# Patient Record
Sex: FEMALE | Race: WHITE | NOT HISPANIC OR LATINO | Employment: UNEMPLOYED
[De-identification: names, ages, dates, MRNs, and addresses within clinical notes are randomized per-mention and may not be internally consistent; named-entity substitution may affect disease eponyms.]

---

## 2017-07-29 ENCOUNTER — HEALTH MAINTENANCE LETTER (OUTPATIENT)
Age: 32
End: 2017-07-29

## 2021-12-31 ENCOUNTER — HOSPITAL ENCOUNTER (OUTPATIENT)
Facility: CLINIC | Age: 36
Setting detail: OBSERVATION
Discharge: HOME | End: 2022-01-01
Attending: EMERGENCY MEDICINE | Admitting: HOSPITALIST
Payer: COMMERCIAL

## 2021-12-31 ENCOUNTER — APPOINTMENT (OUTPATIENT)
Dept: ULTRASOUND IMAGING | Facility: CLINIC | Age: 36
End: 2021-12-31
Attending: EMERGENCY MEDICINE
Payer: COMMERCIAL

## 2021-12-31 DIAGNOSIS — K81.0 ACUTE CHOLECYSTITIS: ICD-10-CM

## 2021-12-31 LAB
ALBUMIN SERPL-MCNC: 3.8 G/DL (ref 3.4–5)
ALP SERPL-CCNC: 111 U/L (ref 40–150)
ALT SERPL W P-5'-P-CCNC: 49 U/L (ref 0–50)
ANION GAP SERPL CALCULATED.3IONS-SCNC: 5 MMOL/L (ref 3–14)
AST SERPL W P-5'-P-CCNC: 55 U/L (ref 0–45)
BASOPHILS # BLD AUTO: 0.1 10E3/UL (ref 0–0.2)
BASOPHILS NFR BLD AUTO: 0 %
BILIRUB DIRECT SERPL-MCNC: 0.2 MG/DL (ref 0–0.2)
BILIRUB SERPL-MCNC: 0.4 MG/DL (ref 0.2–1.3)
BUN SERPL-MCNC: 15 MG/DL (ref 7–30)
CALCIUM SERPL-MCNC: 9.2 MG/DL (ref 8.5–10.1)
CHLORIDE BLD-SCNC: 105 MMOL/L (ref 94–109)
CO2 SERPL-SCNC: 28 MMOL/L (ref 20–32)
CREAT SERPL-MCNC: 0.95 MG/DL (ref 0.52–1.04)
EOSINOPHIL # BLD AUTO: 0.1 10E3/UL (ref 0–0.7)
EOSINOPHIL NFR BLD AUTO: 0 %
ERYTHROCYTE [DISTWIDTH] IN BLOOD BY AUTOMATED COUNT: 13.1 % (ref 10–15)
GFR SERPL CREATININE-BSD FRML MDRD: 80 ML/MIN/1.73M2
GLUCOSE BLD-MCNC: 84 MG/DL (ref 70–99)
HCG SERPL QL: NEGATIVE
HCT VFR BLD AUTO: 38.5 % (ref 35–47)
HGB BLD-MCNC: 12.9 G/DL (ref 11.7–15.7)
IMM GRANULOCYTES # BLD: 0.1 10E3/UL
IMM GRANULOCYTES NFR BLD: 1 %
LIPASE SERPL-CCNC: 148 U/L (ref 73–393)
LYMPHOCYTES # BLD AUTO: 2.9 10E3/UL (ref 0.8–5.3)
LYMPHOCYTES NFR BLD AUTO: 15 %
MCH RBC QN AUTO: 29.9 PG (ref 26.5–33)
MCHC RBC AUTO-ENTMCNC: 33.5 G/DL (ref 31.5–36.5)
MCV RBC AUTO: 89 FL (ref 78–100)
MONOCYTES # BLD AUTO: 1.1 10E3/UL (ref 0–1.3)
MONOCYTES NFR BLD AUTO: 6 %
NEUTROPHILS # BLD AUTO: 14.8 10E3/UL (ref 1.6–8.3)
NEUTROPHILS NFR BLD AUTO: 78 %
NRBC # BLD AUTO: 0 10E3/UL
NRBC BLD AUTO-RTO: 0 /100
PLATELET # BLD AUTO: 406 10E3/UL (ref 150–450)
POTASSIUM BLD-SCNC: 3.6 MMOL/L (ref 3.4–5.3)
PROT SERPL-MCNC: 7.7 G/DL (ref 6.8–8.8)
RBC # BLD AUTO: 4.32 10E6/UL (ref 3.8–5.2)
SODIUM SERPL-SCNC: 138 MMOL/L (ref 133–144)
WBC # BLD AUTO: 19.1 10E3/UL (ref 4–11)

## 2021-12-31 PROCEDURE — 85025 COMPLETE CBC W/AUTO DIFF WBC: CPT | Performed by: EMERGENCY MEDICINE

## 2021-12-31 PROCEDURE — 36415 COLL VENOUS BLD VENIPUNCTURE: CPT | Performed by: EMERGENCY MEDICINE

## 2021-12-31 PROCEDURE — C9803 HOPD COVID-19 SPEC COLLECT: HCPCS

## 2021-12-31 PROCEDURE — 93005 ELECTROCARDIOGRAM TRACING: CPT

## 2021-12-31 PROCEDURE — 80053 COMPREHEN METABOLIC PANEL: CPT | Performed by: EMERGENCY MEDICINE

## 2021-12-31 PROCEDURE — 96375 TX/PRO/DX INJ NEW DRUG ADDON: CPT

## 2021-12-31 PROCEDURE — 83690 ASSAY OF LIPASE: CPT | Performed by: EMERGENCY MEDICINE

## 2021-12-31 PROCEDURE — 82248 BILIRUBIN DIRECT: CPT | Performed by: EMERGENCY MEDICINE

## 2021-12-31 PROCEDURE — 250N000009 HC RX 250: Performed by: EMERGENCY MEDICINE

## 2021-12-31 PROCEDURE — 99285 EMERGENCY DEPT VISIT HI MDM: CPT | Mod: 25

## 2021-12-31 PROCEDURE — 76705 ECHO EXAM OF ABDOMEN: CPT

## 2021-12-31 PROCEDURE — 84703 CHORIONIC GONADOTROPIN ASSAY: CPT | Performed by: EMERGENCY MEDICINE

## 2021-12-31 RX ADMIN — FAMOTIDINE 20 MG: 10 INJECTION, SOLUTION INTRAVENOUS at 23:29

## 2021-12-31 ASSESSMENT — ENCOUNTER SYMPTOMS
FEVER: 0
NAUSEA: 0
COUGH: 0
SHORTNESS OF BREATH: 0

## 2021-12-31 ASSESSMENT — MIFFLIN-ST. JEOR
SCORE: 1723.27
SCORE: 1519.15

## 2022-01-01 ENCOUNTER — ANESTHESIA (OUTPATIENT)
Dept: SURGERY | Facility: CLINIC | Age: 37
End: 2022-01-01
Payer: COMMERCIAL

## 2022-01-01 ENCOUNTER — ANESTHESIA EVENT (OUTPATIENT)
Dept: SURGERY | Facility: CLINIC | Age: 37
End: 2022-01-01
Payer: COMMERCIAL

## 2022-01-01 VITALS
DIASTOLIC BLOOD PRESSURE: 69 MMHG | HEART RATE: 90 BPM | BODY MASS INDEX: 40.19 KG/M2 | TEMPERATURE: 97.7 F | HEIGHT: 64 IN | WEIGHT: 235.4 LBS | OXYGEN SATURATION: 100 % | SYSTOLIC BLOOD PRESSURE: 130 MMHG | RESPIRATION RATE: 13 BRPM

## 2022-01-01 PROBLEM — K81.0 ACUTE CHOLECYSTITIS: Status: ACTIVE | Noted: 2022-01-01

## 2022-01-01 LAB
ALBUMIN SERPL-MCNC: 3.7 G/DL (ref 3.4–5)
ALP SERPL-CCNC: 114 U/L (ref 40–150)
ALT SERPL W P-5'-P-CCNC: 111 U/L (ref 0–50)
ANION GAP SERPL CALCULATED.3IONS-SCNC: 4 MMOL/L (ref 3–14)
AST SERPL W P-5'-P-CCNC: 104 U/L (ref 0–45)
ATRIAL RATE - MUSE: 122 BPM
BILIRUB SERPL-MCNC: 0.3 MG/DL (ref 0.2–1.3)
BUN SERPL-MCNC: 11 MG/DL (ref 7–30)
CALCIUM SERPL-MCNC: 8.6 MG/DL (ref 8.5–10.1)
CHLORIDE BLD-SCNC: 110 MMOL/L (ref 94–109)
CO2 SERPL-SCNC: 23 MMOL/L (ref 20–32)
CREAT SERPL-MCNC: 0.87 MG/DL (ref 0.52–1.04)
DIASTOLIC BLOOD PRESSURE - MUSE: NORMAL MMHG
ERYTHROCYTE [DISTWIDTH] IN BLOOD BY AUTOMATED COUNT: 13.2 % (ref 10–15)
GFR SERPL CREATININE-BSD FRML MDRD: 89 ML/MIN/1.73M2
GLUCOSE BLD-MCNC: 96 MG/DL (ref 70–99)
HCT VFR BLD AUTO: 40.4 % (ref 35–47)
HGB BLD-MCNC: 13 G/DL (ref 11.7–15.7)
HOLD SPECIMEN: NORMAL
INTERPRETATION ECG - MUSE: NORMAL
MCH RBC QN AUTO: 29.4 PG (ref 26.5–33)
MCHC RBC AUTO-ENTMCNC: 32.2 G/DL (ref 31.5–36.5)
MCV RBC AUTO: 91 FL (ref 78–100)
P AXIS - MUSE: 67 DEGREES
PLATELET # BLD AUTO: 386 10E3/UL (ref 150–450)
POTASSIUM BLD-SCNC: 3.9 MMOL/L (ref 3.4–5.3)
PR INTERVAL - MUSE: 136 MS
PROT SERPL-MCNC: 7.3 G/DL (ref 6.8–8.8)
QRS DURATION - MUSE: 78 MS
QT - MUSE: 316 MS
QTC - MUSE: 450 MS
R AXIS - MUSE: 13 DEGREES
RBC # BLD AUTO: 4.42 10E6/UL (ref 3.8–5.2)
SARS-COV-2 RNA RESP QL NAA+PROBE: NEGATIVE
SODIUM SERPL-SCNC: 137 MMOL/L (ref 133–144)
SYSTOLIC BLOOD PRESSURE - MUSE: NORMAL MMHG
T AXIS - MUSE: 40 DEGREES
VENTRICULAR RATE- MUSE: 122 BPM
WBC # BLD AUTO: 18 10E3/UL (ref 4–11)

## 2022-01-01 PROCEDURE — 88304 TISSUE EXAM BY PATHOLOGIST: CPT | Mod: 26 | Performed by: PATHOLOGY

## 2022-01-01 PROCEDURE — 96361 HYDRATE IV INFUSION ADD-ON: CPT

## 2022-01-01 PROCEDURE — 250N000011 HC RX IP 250 OP 636: Performed by: PHYSICIAN ASSISTANT

## 2022-01-01 PROCEDURE — 99207 PR MOONLIGHTING INDICATOR: CPT | Performed by: HOSPITALIST

## 2022-01-01 PROCEDURE — 250N000009 HC RX 250: Performed by: SURGERY

## 2022-01-01 PROCEDURE — 250N000011 HC RX IP 250 OP 636: Performed by: INTERNAL MEDICINE

## 2022-01-01 PROCEDURE — 36415 COLL VENOUS BLD VENIPUNCTURE: CPT | Performed by: HOSPITALIST

## 2022-01-01 PROCEDURE — 99207 PR CDG-CHARGE REQUIRED MANUAL ENTRY: CPT | Performed by: HOSPITALIST

## 2022-01-01 PROCEDURE — 250N000011 HC RX IP 250 OP 636: Performed by: NURSE ANESTHETIST, CERTIFIED REGISTERED

## 2022-01-01 PROCEDURE — 250N000025 HC SEVOFLURANE, PER MIN: Performed by: SURGERY

## 2022-01-01 PROCEDURE — 80053 COMPREHEN METABOLIC PANEL: CPT | Performed by: PHYSICIAN ASSISTANT

## 2022-01-01 PROCEDURE — 272N000001 HC OR GENERAL SUPPLY STERILE: Performed by: SURGERY

## 2022-01-01 PROCEDURE — G0378 HOSPITAL OBSERVATION PER HR: HCPCS

## 2022-01-01 PROCEDURE — 999N000141 HC STATISTIC PRE-PROCEDURE NURSING ASSESSMENT: Performed by: SURGERY

## 2022-01-01 PROCEDURE — 250N000011 HC RX IP 250 OP 636: Performed by: EMERGENCY MEDICINE

## 2022-01-01 PROCEDURE — 258N000003 HC RX IP 258 OP 636: Performed by: EMERGENCY MEDICINE

## 2022-01-01 PROCEDURE — 87635 SARS-COV-2 COVID-19 AMP PRB: CPT | Performed by: EMERGENCY MEDICINE

## 2022-01-01 PROCEDURE — 250N000013 HC RX MED GY IP 250 OP 250 PS 637: Performed by: ANESTHESIOLOGY

## 2022-01-01 PROCEDURE — 88304 TISSUE EXAM BY PATHOLOGIST: CPT | Mod: TC | Performed by: SURGERY

## 2022-01-01 PROCEDURE — 250N000011 HC RX IP 250 OP 636: Performed by: ANESTHESIOLOGY

## 2022-01-01 PROCEDURE — 250N000013 HC RX MED GY IP 250 OP 250 PS 637: Performed by: PHYSICIAN ASSISTANT

## 2022-01-01 PROCEDURE — 85027 COMPLETE CBC AUTOMATED: CPT | Performed by: PHYSICIAN ASSISTANT

## 2022-01-01 PROCEDURE — 710N000009 HC RECOVERY PHASE 1, LEVEL 1, PER MIN: Performed by: SURGERY

## 2022-01-01 PROCEDURE — 36415 COLL VENOUS BLD VENIPUNCTURE: CPT | Performed by: PHYSICIAN ASSISTANT

## 2022-01-01 PROCEDURE — 258N000003 HC RX IP 258 OP 636: Performed by: REGISTERED NURSE

## 2022-01-01 PROCEDURE — 99235 HOSP IP/OBS SAME DATE MOD 70: CPT | Performed by: HOSPITALIST

## 2022-01-01 PROCEDURE — 99204 OFFICE O/P NEW MOD 45 MIN: CPT | Mod: 57 | Performed by: SURGERY

## 2022-01-01 PROCEDURE — 96365 THER/PROPH/DIAG IV INF INIT: CPT

## 2022-01-01 PROCEDURE — 87040 BLOOD CULTURE FOR BACTERIA: CPT | Performed by: HOSPITALIST

## 2022-01-01 PROCEDURE — 370N000017 HC ANESTHESIA TECHNICAL FEE, PER MIN: Performed by: SURGERY

## 2022-01-01 PROCEDURE — 250N000009 HC RX 250: Performed by: PHYSICIAN ASSISTANT

## 2022-01-01 PROCEDURE — 250N000011 HC RX IP 250 OP 636: Performed by: REGISTERED NURSE

## 2022-01-01 PROCEDURE — 47562 LAPAROSCOPIC CHOLECYSTECTOMY: CPT | Performed by: SURGERY

## 2022-01-01 PROCEDURE — 99207 PR NO CHARGE LOS: CPT | Performed by: INTERNAL MEDICINE

## 2022-01-01 PROCEDURE — 360N000076 HC SURGERY LEVEL 3, PER MIN: Performed by: SURGERY

## 2022-01-01 PROCEDURE — 250N000009 HC RX 250: Performed by: REGISTERED NURSE

## 2022-01-01 PROCEDURE — 47562 LAPAROSCOPIC CHOLECYSTECTOMY: CPT | Mod: AS | Performed by: PHYSICIAN ASSISTANT

## 2022-01-01 PROCEDURE — 120N000001 HC R&B MED SURG/OB

## 2022-01-01 PROCEDURE — 258N000003 HC RX IP 258 OP 636: Performed by: NURSE ANESTHETIST, CERTIFIED REGISTERED

## 2022-01-01 PROCEDURE — 250N000009 HC RX 250: Performed by: ANESTHESIOLOGY

## 2022-01-01 RX ORDER — PROPOFOL 10 MG/ML
INJECTION, EMULSION INTRAVENOUS PRN
Status: DISCONTINUED | OUTPATIENT
Start: 2022-01-01 | End: 2022-01-01

## 2022-01-01 RX ORDER — LEVONORGESTREL/ETHIN.ESTRADIOL 0.1-0.02MG
1 TABLET ORAL DAILY
Status: DISCONTINUED | OUTPATIENT
Start: 2022-01-01 | End: 2022-01-01 | Stop reason: HOSPADM

## 2022-01-01 RX ORDER — DEXMEDETOMIDINE HYDROCHLORIDE 4 UG/ML
INJECTION, SOLUTION INTRAVENOUS PRN
Status: DISCONTINUED | OUTPATIENT
Start: 2022-01-01 | End: 2022-01-01

## 2022-01-01 RX ORDER — PROPOFOL 10 MG/ML
INJECTION, EMULSION INTRAVENOUS CONTINUOUS PRN
Status: DISCONTINUED | OUTPATIENT
Start: 2022-01-01 | End: 2022-01-01

## 2022-01-01 RX ORDER — FENTANYL CITRATE 0.05 MG/ML
25 INJECTION, SOLUTION INTRAMUSCULAR; INTRAVENOUS EVERY 5 MIN PRN
Status: DISCONTINUED | OUTPATIENT
Start: 2022-01-01 | End: 2022-01-01 | Stop reason: HOSPADM

## 2022-01-01 RX ORDER — NALOXONE HYDROCHLORIDE 0.4 MG/ML
0.2 INJECTION, SOLUTION INTRAMUSCULAR; INTRAVENOUS; SUBCUTANEOUS
Status: DISCONTINUED | OUTPATIENT
Start: 2022-01-01 | End: 2022-01-01 | Stop reason: HOSPADM

## 2022-01-01 RX ORDER — CEFTRIAXONE 2 G/1
2 INJECTION, POWDER, FOR SOLUTION INTRAMUSCULAR; INTRAVENOUS EVERY 24 HOURS
Status: DISCONTINUED | OUTPATIENT
Start: 2022-01-02 | End: 2022-01-01

## 2022-01-01 RX ORDER — BUPIVACAINE HYDROCHLORIDE AND EPINEPHRINE 2.5; 5 MG/ML; UG/ML
INJECTION, SOLUTION INFILTRATION; PERINEURAL PRN
Status: DISCONTINUED | OUTPATIENT
Start: 2022-01-01 | End: 2022-01-01 | Stop reason: HOSPADM

## 2022-01-01 RX ORDER — LIDOCAINE HYDROCHLORIDE 20 MG/ML
INJECTION, SOLUTION INFILTRATION; PERINEURAL PRN
Status: DISCONTINUED | OUTPATIENT
Start: 2022-01-01 | End: 2022-01-01

## 2022-01-01 RX ORDER — PROCHLORPERAZINE MALEATE 10 MG
10 TABLET ORAL EVERY 6 HOURS PRN
Status: DISCONTINUED | OUTPATIENT
Start: 2022-01-01 | End: 2022-01-01 | Stop reason: HOSPADM

## 2022-01-01 RX ORDER — DEXAMETHASONE SODIUM PHOSPHATE 4 MG/ML
INJECTION, SOLUTION INTRA-ARTICULAR; INTRALESIONAL; INTRAMUSCULAR; INTRAVENOUS; SOFT TISSUE PRN
Status: DISCONTINUED | OUTPATIENT
Start: 2022-01-01 | End: 2022-01-01

## 2022-01-01 RX ORDER — OXYCODONE HYDROCHLORIDE 5 MG/1
5 TABLET ORAL EVERY 4 HOURS PRN
Status: DISCONTINUED | OUTPATIENT
Start: 2022-01-01 | End: 2022-01-01 | Stop reason: HOSPADM

## 2022-01-01 RX ORDER — FENTANYL CITRATE 50 UG/ML
INJECTION, SOLUTION INTRAMUSCULAR; INTRAVENOUS PRN
Status: DISCONTINUED | OUTPATIENT
Start: 2022-01-01 | End: 2022-01-01

## 2022-01-01 RX ORDER — SODIUM CHLORIDE, SODIUM LACTATE, POTASSIUM CHLORIDE, CALCIUM CHLORIDE 600; 310; 30; 20 MG/100ML; MG/100ML; MG/100ML; MG/100ML
INJECTION, SOLUTION INTRAVENOUS CONTINUOUS
Status: DISCONTINUED | OUTPATIENT
Start: 2022-01-01 | End: 2022-01-01 | Stop reason: HOSPADM

## 2022-01-01 RX ORDER — ONDANSETRON 2 MG/ML
4 INJECTION INTRAMUSCULAR; INTRAVENOUS EVERY 6 HOURS PRN
Status: DISCONTINUED | OUTPATIENT
Start: 2022-01-01 | End: 2022-01-01 | Stop reason: HOSPADM

## 2022-01-01 RX ORDER — LORATADINE 10 MG/1
10 TABLET ORAL DAILY
Status: DISCONTINUED | OUTPATIENT
Start: 2022-01-01 | End: 2022-01-01 | Stop reason: HOSPADM

## 2022-01-01 RX ORDER — ONDANSETRON 4 MG/1
4 TABLET, ORALLY DISINTEGRATING ORAL EVERY 30 MIN PRN
Status: DISCONTINUED | OUTPATIENT
Start: 2022-01-01 | End: 2022-01-01 | Stop reason: HOSPADM

## 2022-01-01 RX ORDER — FENTANYL CITRATE 0.05 MG/ML
25 INJECTION, SOLUTION INTRAMUSCULAR; INTRAVENOUS
Status: CANCELLED | OUTPATIENT
Start: 2022-01-01

## 2022-01-01 RX ORDER — ONDANSETRON 4 MG/1
4 TABLET, ORALLY DISINTEGRATING ORAL EVERY 6 HOURS PRN
Status: DISCONTINUED | OUTPATIENT
Start: 2022-01-01 | End: 2022-01-01 | Stop reason: HOSPADM

## 2022-01-01 RX ORDER — DULOXETIN HYDROCHLORIDE 30 MG/1
30 CAPSULE, DELAYED RELEASE ORAL 2 TIMES DAILY
Status: DISCONTINUED | OUTPATIENT
Start: 2022-01-01 | End: 2022-01-01 | Stop reason: HOSPADM

## 2022-01-01 RX ORDER — PROCHLORPERAZINE 25 MG
25 SUPPOSITORY, RECTAL RECTAL EVERY 12 HOURS PRN
Status: DISCONTINUED | OUTPATIENT
Start: 2022-01-01 | End: 2022-01-01 | Stop reason: HOSPADM

## 2022-01-01 RX ORDER — ACETAMINOPHEN 325 MG/1
650 TABLET ORAL EVERY 6 HOURS PRN
Status: DISCONTINUED | OUTPATIENT
Start: 2022-01-01 | End: 2022-01-01 | Stop reason: HOSPADM

## 2022-01-01 RX ORDER — CEFTRIAXONE 1 G/1
1 INJECTION, POWDER, FOR SOLUTION INTRAMUSCULAR; INTRAVENOUS EVERY 24 HOURS
Status: DISCONTINUED | OUTPATIENT
Start: 2022-01-02 | End: 2022-01-01

## 2022-01-01 RX ORDER — KETOROLAC TROMETHAMINE 30 MG/ML
30 INJECTION, SOLUTION INTRAMUSCULAR; INTRAVENOUS ONCE
Status: COMPLETED | OUTPATIENT
Start: 2022-01-01 | End: 2022-01-01

## 2022-01-01 RX ORDER — LIDOCAINE 40 MG/G
CREAM TOPICAL
Status: DISCONTINUED | OUTPATIENT
Start: 2022-01-01 | End: 2022-01-01 | Stop reason: HOSPADM

## 2022-01-01 RX ORDER — HYDROXYZINE HYDROCHLORIDE 25 MG/1
25 TABLET, FILM COATED ORAL ONCE
Status: COMPLETED | OUTPATIENT
Start: 2022-01-01 | End: 2022-01-01

## 2022-01-01 RX ORDER — NALOXONE HYDROCHLORIDE 0.4 MG/ML
0.4 INJECTION, SOLUTION INTRAMUSCULAR; INTRAVENOUS; SUBCUTANEOUS
Status: DISCONTINUED | OUTPATIENT
Start: 2022-01-01 | End: 2022-01-01 | Stop reason: HOSPADM

## 2022-01-01 RX ORDER — MEPERIDINE HYDROCHLORIDE 25 MG/ML
12.5 INJECTION INTRAMUSCULAR; INTRAVENOUS; SUBCUTANEOUS
Status: DISCONTINUED | OUTPATIENT
Start: 2022-01-01 | End: 2022-01-01 | Stop reason: HOSPADM

## 2022-01-01 RX ORDER — OXYCODONE HYDROCHLORIDE 5 MG/1
5 TABLET ORAL EVERY 4 HOURS PRN
Qty: 12 TABLET | Refills: 0 | Status: SHIPPED | OUTPATIENT
Start: 2022-01-01

## 2022-01-01 RX ORDER — ONDANSETRON 2 MG/ML
4 INJECTION INTRAMUSCULAR; INTRAVENOUS EVERY 30 MIN PRN
Status: DISCONTINUED | OUTPATIENT
Start: 2022-01-01 | End: 2022-01-01 | Stop reason: HOSPADM

## 2022-01-01 RX ORDER — MULTIVITAMIN,THERAPEUTIC
1 TABLET ORAL DAILY
COMMUNITY

## 2022-01-01 RX ORDER — LORATADINE 10 MG/1
10 TABLET ORAL DAILY
COMMUNITY

## 2022-01-01 RX ORDER — HYDROMORPHONE HCL IN WATER/PF 6 MG/30 ML
0.2 PATIENT CONTROLLED ANALGESIA SYRINGE INTRAVENOUS EVERY 5 MIN PRN
Status: DISCONTINUED | OUTPATIENT
Start: 2022-01-01 | End: 2022-01-01 | Stop reason: HOSPADM

## 2022-01-01 RX ORDER — BUPROPION HYDROCHLORIDE 100 MG/1
100 TABLET ORAL DAILY
Status: DISCONTINUED | OUTPATIENT
Start: 2022-01-01 | End: 2022-01-01 | Stop reason: HOSPADM

## 2022-01-01 RX ORDER — DULOXETIN HYDROCHLORIDE 30 MG/1
30 CAPSULE, DELAYED RELEASE ORAL 2 TIMES DAILY
Status: ON HOLD | COMMUNITY
End: 2022-01-01

## 2022-01-01 RX ORDER — SCOLOPAMINE TRANSDERMAL SYSTEM 1 MG/1
1 PATCH, EXTENDED RELEASE TRANSDERMAL
Status: DISCONTINUED | OUTPATIENT
Start: 2022-01-01 | End: 2022-01-01 | Stop reason: HOSPADM

## 2022-01-01 RX ORDER — CEFTRIAXONE 1 G/1
1 INJECTION, POWDER, FOR SOLUTION INTRAMUSCULAR; INTRAVENOUS ONCE
Status: COMPLETED | OUTPATIENT
Start: 2022-01-01 | End: 2022-01-01

## 2022-01-01 RX ORDER — SODIUM CHLORIDE, SODIUM LACTATE, POTASSIUM CHLORIDE, CALCIUM CHLORIDE 600; 310; 30; 20 MG/100ML; MG/100ML; MG/100ML; MG/100ML
INJECTION, SOLUTION INTRAVENOUS CONTINUOUS PRN
Status: DISCONTINUED | OUTPATIENT
Start: 2022-01-01 | End: 2022-01-01

## 2022-01-01 RX ORDER — DULOXETIN HYDROCHLORIDE 20 MG/1
20 CAPSULE, DELAYED RELEASE ORAL DAILY
COMMUNITY

## 2022-01-01 RX ORDER — ACETAMINOPHEN 650 MG/1
650 SUPPOSITORY RECTAL EVERY 6 HOURS PRN
Status: DISCONTINUED | OUTPATIENT
Start: 2022-01-01 | End: 2022-01-01 | Stop reason: HOSPADM

## 2022-01-01 RX ORDER — BUPROPION HYDROCHLORIDE 150 MG/1
150 TABLET ORAL EVERY MORNING
COMMUNITY

## 2022-01-01 RX ADMIN — PROPOFOL 30 MCG/KG/MIN: 10 INJECTION, EMULSION INTRAVENOUS at 09:26

## 2022-01-01 RX ADMIN — METRONIDAZOLE 500 MG: 500 INJECTION, SOLUTION INTRAVENOUS at 09:52

## 2022-01-01 RX ADMIN — PROPOFOL 300 MG: 10 INJECTION, EMULSION INTRAVENOUS at 09:22

## 2022-01-01 RX ADMIN — ROCURONIUM BROMIDE 60 MG: 50 INJECTION, SOLUTION INTRAVENOUS at 09:22

## 2022-01-01 RX ADMIN — FENTANYL CITRATE 50 MCG: 50 INJECTION, SOLUTION INTRAMUSCULAR; INTRAVENOUS at 09:22

## 2022-01-01 RX ADMIN — FENTANYL CITRATE 50 MCG: 50 INJECTION, SOLUTION INTRAMUSCULAR; INTRAVENOUS at 09:37

## 2022-01-01 RX ADMIN — HYDROMORPHONE HYDROCHLORIDE 0.5 MG: 1 INJECTION, SOLUTION INTRAMUSCULAR; INTRAVENOUS; SUBCUTANEOUS at 09:37

## 2022-01-01 RX ADMIN — SCOPALAMINE 1 PATCH: 1 PATCH, EXTENDED RELEASE TRANSDERMAL at 09:03

## 2022-01-01 RX ADMIN — SUGAMMADEX 400 MG: 100 INJECTION, SOLUTION INTRAVENOUS at 10:03

## 2022-01-01 RX ADMIN — DEXMEDETOMIDINE HYDROCHLORIDE 12 MCG: 200 INJECTION INTRAVENOUS at 09:35

## 2022-01-01 RX ADMIN — DEXMEDETOMIDINE HYDROCHLORIDE 8 MCG: 200 INJECTION INTRAVENOUS at 09:57

## 2022-01-01 RX ADMIN — LIDOCAINE HYDROCHLORIDE 100 MG: 20 INJECTION, SOLUTION INFILTRATION; PERINEURAL at 09:22

## 2022-01-01 RX ADMIN — SODIUM CHLORIDE 1000 ML: 9 INJECTION, SOLUTION INTRAVENOUS at 02:06

## 2022-01-01 RX ADMIN — SODIUM CHLORIDE, POTASSIUM CHLORIDE, SODIUM LACTATE AND CALCIUM CHLORIDE: 600; 310; 30; 20 INJECTION, SOLUTION INTRAVENOUS at 09:15

## 2022-01-01 RX ADMIN — FENTANYL CITRATE 25 MCG: 0.05 INJECTION, SOLUTION INTRAMUSCULAR; INTRAVENOUS at 10:37

## 2022-01-01 RX ADMIN — ACETAMINOPHEN 650 MG: 325 TABLET, FILM COATED ORAL at 14:10

## 2022-01-01 RX ADMIN — CEFTRIAXONE SODIUM 1 G: 1 INJECTION, POWDER, FOR SOLUTION INTRAMUSCULAR; INTRAVENOUS at 00:57

## 2022-01-01 RX ADMIN — KETOROLAC TROMETHAMINE 30 MG: 30 INJECTION, SOLUTION INTRAMUSCULAR; INTRAVENOUS at 10:54

## 2022-01-01 RX ADMIN — HYDROXYZINE HYDROCHLORIDE 25 MG: 25 TABLET ORAL at 09:03

## 2022-01-01 RX ADMIN — CEFEPIME HYDROCHLORIDE 2 G: 2 INJECTION, POWDER, FOR SOLUTION INTRAVENOUS at 15:03

## 2022-01-01 RX ADMIN — MIDAZOLAM 2 MG: 1 INJECTION INTRAMUSCULAR; INTRAVENOUS at 09:15

## 2022-01-01 RX ADMIN — DEXAMETHASONE SODIUM PHOSPHATE 4 MG: 4 INJECTION, SOLUTION INTRA-ARTICULAR; INTRALESIONAL; INTRAMUSCULAR; INTRAVENOUS; SOFT TISSUE at 09:35

## 2022-01-01 RX ADMIN — PHENYLEPHRINE HYDROCHLORIDE 100 MCG: 10 INJECTION INTRAVENOUS at 09:43

## 2022-01-01 ASSESSMENT — ACTIVITIES OF DAILY LIVING (ADL)
ADLS_ACUITY_SCORE: 7
ADLS_ACUITY_SCORE: 4
ADLS_ACUITY_SCORE: 7
ADLS_ACUITY_SCORE: 7
ADLS_ACUITY_SCORE: 4

## 2022-01-01 ASSESSMENT — LIFESTYLE VARIABLES: TOBACCO_USE: 0

## 2022-01-01 ASSESSMENT — MIFFLIN-ST. JEOR: SCORE: 1747.77

## 2022-01-01 ASSESSMENT — ENCOUNTER SYMPTOMS
SEIZURES: 0
DYSRHYTHMIAS: 0

## 2022-01-01 NOTE — ANESTHESIA POSTPROCEDURE EVALUATION
Patient: Diane Robison    Procedure: Procedure(s):  LAPAROSCOPIC CHOLECYSTECTOMY       Diagnosis:Acute cholecystitis [K81.0]  Diagnosis Additional Information: No value filed.    Anesthesia Type:  General    Note:  Disposition: Admission   Postop Pain Control: Uneventful            Sign Out: Well controlled pain   PONV: No   Neuro/Psych: Uneventful            Sign Out: Acceptable/Baseline neuro status   Airway/Respiratory: Uneventful            Sign Out: Acceptable/Baseline resp. status   CV/Hemodynamics: Uneventful            Sign Out: Acceptable CV status; No obvious hypovolemia; No obvious fluid overload   Other NRE: NONE   DID A NON-ROUTINE EVENT OCCUR? No           Last vitals:  Vitals Value Taken Time   /78 01/01/22 1120   Temp 36.8  C (98.2  F) 01/01/22 1110   Pulse 86 01/01/22 1124   Resp 17 01/01/22 1124   SpO2 99 % 01/01/22 1124   Vitals shown include unvalidated device data.    Electronically Signed By: Katia Dugan MD  January 1, 2022  3:41 PM

## 2022-01-01 NOTE — BRIEF OP NOTE
[facility]    Brief Operative Note    Pre-operative diagnosis: Acute cholecystitis [K81.0]  Post-operative diagnosis Acute Cholecystitis    Procedure: Procedure(s):  LAPAROSCOPIC CHOLECYSTECTOMY     Surgeon: Surgeon(s) and Role:     * Juan C Caro MD - Primary     * Marco Husain PA-C - Assisting     Anesthesia: General   Estimated Blood Loss: Less than 10 ml    Drains: None  Specimens:   ID Type Source Tests Collected by Time Destination   1 : gallbladder and contents Tissue Gallbladder SURGICAL PATHOLOGY EXAM Juan C Caro MD 1/1/2022  9:40 AM      Findings:   None.  Complications: None.  Implants: * No implants in log *

## 2022-01-01 NOTE — H&P
[facility]    History and Physical - Hospitalist Service       Date of Admission:  12/31/2021    Assessment & Plan      Diane Robison is a 36 year old female admitted on 12/31/2021. She has a PMH most remarkable for anxiety/depression who presented with intermittent epigastric pain. She was admitted with acute cholecystitis.    1. Acute Cholecystitis. Patient has right upper quadrant pain / epigastric with evidence of mild gallbladder dilation with a WBC of 19. She has mildly elevated AST (55) but no pattern of other biliary obstruction. No other obvious source of WBC elevation.   --General surgery consult  --Continue ceftriaxone which was ordered in the ER  --Ordered blood cultures  --NPO    Chronic Issues: Awaiting med rec  1. Depression with anxiety  2. Hypothyroidism  3. Adjustment disorder     Diet:   NPO  DVT Prophylaxis: Pneumatic Compression Devices  Perkins Catheter: Not present  Central Lines: None  Code Status:   Full Code    Clinically Significant Risk Factors Present on Admission                # Obesity: last Body mass index is 31.76 kg/m .      Disposition Plan   Expected Discharge: Patient is admitted with acute cholecystitis. Will likely discharge in 1 - 2 days to prior living arrangement.  Anticipated discharge location:  Awaiting care coordination huddle  Delays:     Pending eval      The patient's care was discussed with the Bedside Nurse and Patient.    Hal Will MD PhD  [facility]  Securely message with the Vocera Web Console (learn more here)  Text page via [employer] Paging/Directory  ______________________________________________________________________    Chief Complaint   Chest / Epigastric Pain    History is obtained from the patient    History of Present Illness   Diane Robison is a 36 year old female admitted on 12/31/2021. She has a PMH most remarkable for anxiety/depression who presented with intermittent epigastric pain. She was  admitted with acute cholecystitis.    Patient notes that she developed two episodes of acute epigastric / right upper quadrant pain while eating tonight. The pain initially subsided and then came back with further food intake. Pain was worse with sitting up and was relieved with laying down. Pain radiated to patients right shoulder.  Patient presented for evaluation and she hasn't had symptoms like this before. No fevers, chills, chest pain, SOB, nausea, vomiting, or diarrhea. No dizziness, light-headedness, syncope, or presyncope.    Review of Systems    The 10 point Review of Systems is negative other than noted in the HPI or here.    Past Medical History    I have reviewed this patient's medical history and updated it with pertinent information if needed.   Past Medical History:   Diagnosis Date     Allergic rhinitis, cause unspecified      Contact dermatitis and other eczema, due to unspecified cause      Primary focal hyperhidrosis     bilateral axillary        Past Surgical History   I have reviewed this patient's surgical history and updated it with pertinent information if needed.  Past Surgical History:   Procedure Laterality Date     TONSILLECTOMY  2007       Social History   I have reviewed this patient's social history and updated it with pertinent information if needed.  Social History     Tobacco Use     Smoking status: Never Smoker     Smokeless tobacco: Never Used   Substance Use Topics     Alcohol use: Yes     Comment: rarely     Drug use: No       Family History   I have reviewed this patient's family history and updated it with pertinent information if needed.  Family History   Problem Relation Age of Onset     Prostate Cancer Maternal Grandfather      Heart Disease Maternal Grandfather      Lipids Maternal Grandmother      Hypertension Mother        Prior to Admission Medications   Prior to Admission Medications   Prescriptions Last Dose Informant Patient Reported? Taking?   MOMETASONE FUROATE  0.1 % EX CREA   No No   Sig: apply twice daily to body as needed.   PIMECROLIMUS 1 % EX CREA   No No   Sig: apply twice daily to face.   buPROPion (WELLBUTRIN) 100 MG tablet   Yes No   Sig: Take 1 tablet by mouth daily.   levonorgestrel-ethinyl estradiol (AVIANE,ALESSE,LESSINA) 0.1-20 MG-MCG per tablet   No No   Sig: Take 1 tablet by mouth daily   levothyroxine (SYNTHROID, LEVOTHROID) 50 MCG tablet   No No   Sig: Take 1 tablet by mouth daily.   venlafaxine (EFFEXOR-ER) 150 MG TB24   No No   Sig: Take 1 tablet by mouth daily.      Facility-Administered Medications: None     Allergies   No Known Allergies    Physical Exam   Vital Signs: Temp: 97.5  F (36.4  C) Temp src: Oral BP: (!) 145/67 Pulse: 80   Resp: 18 SpO2: 100 % O2 Device: None (Room air)    Weight: 185 lbs 0 oz    General Appearance: Well appearing, no distress  Eyes: MARILYNN, EOMI  HEENT: NC, AT. MMM.   Respiratory: CTAB, normal work of breathing  Cardiovascular: Regular Rate and Rhythm, normal S1, S2. No murmurs, rubs, gallops  GI: Soft, mild tenderness at RUQ and epigastric area  Lymph/Hematologic: No asymetric swelling, edema. No bruising.  Genitourinary: Deferred  Skin: No rashes, lesions, wounds.  Musculoskeletal: Warm, well perfused  Neurologic: AOx4, CNII-XII intact.  Psychiatric: Euthymic. Mood appropriate.     Data   Data reviewed today: I reviewed all medications, new labs and imaging results over the last 24 hours. I personally reviewed the right upper quadrant ultrasound image(s) showing findings concerning for cholecystitis.    Recent Labs   Lab 12/31/21  2313   WBC 19.1*   HGB 12.9   MCV 89         POTASSIUM 3.6   CHLORIDE 105   CO2 28   BUN 15   CR 0.95   ANIONGAP 5   BRICE 9.2   GLC 84   ALBUMIN 3.8   PROTTOTAL 7.7   BILITOTAL 0.4   ALKPHOS 111   ALT 49   AST 55*   LIPASE 148

## 2022-01-01 NOTE — OP NOTE
General Surgery Operative Note   PREOPERATIVE DIAGNOSIS: Cholelithiasis/cholecystitis  POSTOPERATIVE DIAGNOSIS: Same  PROCEDURE: LAPAROSCOPIC CHOLECYSTECTOMY   ANESTHESIA: General.   PREOPERATIVE MEDICATIONS: Received cefotetan hand earlier  SURGEON: Juan C Caro MD   ASSISTANT: Brigido Husain PA-C, Physician assistant first assistant was necessary during the performance of this procedure for expertise in patient positioning, prepping, draping, trocar placement, camera management, retraction and exposure, and suctioning.  INDICATIONS: Patient presented with signs and symptoms consistent with cholelithiasis/cholecystitis.  Extensive discussion was undertaken regarding treatment options.  We reviewed the procedure of cholecystectomy.  Risks including but not limited to bleeding, infection, bile duct or visceral injury were all reviewed in great detail.  The patient's questions were all answered to satisfaction.  The patient wishes to proceed.  PROCEDURE: After informed consent was obtained the patient was taken to the operating suite and uneventfully endotracheally intubated. The abdomen was prepped and draped in a sterile fashion. Surgeon initiated timeout was acknowledged. A stab incision was then made within the umbilicus through which a 5mm optical trocar was used to gain access to the abdominal cavity. A CO2 pneumoperitoneum was then created. An 11mm trocar was then placed in the subxiphoid position and a 5mmport was placed in the right subcostal position. We elevated the liver and were able to identify the gallbladder. The gallbladder was grasped and used to elevate the liver further. I began dissecting out some fatty adhesions down near the neck of the gallbladder until a cystic duct was encountered. I continued the dissection using combination of sharp and blunt dissection until the cystic duct was largely dissected out. I continued the dissection up along the sides of the gallbladder, both medially and  laterally, until I had created a space between the gallbladder and the liver. At this point, I encountered the cystic artery, just posterior and lateral to the cystic duct. This again was dissected out. Once I had created a window where only the cystic artery and duct were noted to be entering the gallbladder, I felt that this represented our critical view. The cystic artery and duct were then doubly clipped and divided. I continued the dissection up along the body of the gallbladder, freeing all attachments and adhesions of the gallbladder to the liver. Gallbladder was removed from the liver in an atraumatic fashion. The gallbladder was then brought up through the subxiphoid port site and removed from the abdomen. The gallbladder fossa was reinspected, and all areas of bleeding were managed with electrocautery. I irrigated the area with normal saline and aspirated it out. I then removed the umbilical port trocar. I then reinspected the abdomen, and everything appeared to be in pristine condition. I removed the trocars under laparoscopic visualization and desufflated the abdomen with the Edgar suction . The skin edges were reapproximated with 4-0 Vicryl and Steri-Strips. The patient was uneventfully extubated, awakened and taken to the PACU in stable condition. At the conclusion of the case, all lap and needle counts were correct.   ESTIMATED BLOOD LOSS: 5cc   Juan C Caro MD, MD

## 2022-01-01 NOTE — ANESTHESIA CARE TRANSFER NOTE
Patient: Diane Robison    Procedure: Procedure(s):  LAPAROSCOPIC CHOLECYSTECTOMY       Diagnosis: Acute cholecystitis [K81.0]  Diagnosis Additional Information: No value filed.    Anesthesia Type:   General     Note:    Oropharynx: oropharynx clear of all foreign objects and spontaneously breathing  Level of Consciousness: drowsy  Oxygen Supplementation: face mask  Level of Supplemental Oxygen (L/min / FiO2): 6  Independent Airway: airway patency satisfactory and stable  Dentition: dentition unchanged  Vital Signs Stable: post-procedure vital signs reviewed and stable  Report to RN Given: handoff report given  Patient transferred to: PACU  Comments: Neuromuscular blockade reversed with sugammadex, spontaneous respirations, adequate tidal volumes, followed commands to voice, oropharynx suctioned with soft flexible catheter, extubated atraumatically, extubated with suction, airway patent after extubation.  Oxygen via facemask at 6 liters per minute to PACU. Oxygen tubing connected to wall O2 in PACU, SpO2, NiBP, and EKG monitors and alarms on and functioning, Sebastian Hugger warmer connected to patient gown, report on patient's clinical status given to PACU RN, RN questions answered.     Handoff Report: Identifed the Patient, Identified the Reponsible Provider, Reviewed the pertinent medical history, Discussed the surgical course, Reviewed Intra-OP anesthesia mangement and issues during anesthesia, Set expectations for post-procedure period and Allowed opportunity for questions and acknowledgement of understanding      Vitals:  Vitals Value Taken Time   /85 01/01/22 1012   Temp 97    Pulse 87 01/01/22 1014   Resp 10   01/01/22 1014   SpO2 99 % 01/01/22 1014   Vitals shown include unvalidated device data.    Electronically Signed By: KILEY Quintanilla CRNA  January 1, 2022  10:15 AM

## 2022-01-01 NOTE — ED TRIAGE NOTES
[facility]  ED Arrival Note    Arrives through triage c/o 10/10 mid-sternal chest pain that radiates to the back. Pt states that it started about 1 PTA. Pt states that she was sitting down conversing when it started. Denies any N/V at this time.    Visitors during triage: Spouse      Triage Interventions: EKG    Ambulatory: Yes    Meets Stroke Criteria?: No    Meets Trauma Criteria?: No    Shock Index: N/A, for provider reference    Directed to: Main ED

## 2022-01-01 NOTE — PHARMACY-ADMISSION MEDICATION HISTORY
Pharmacy Medication History  Admission medication history interview status for the 12/31/2021  admission is complete. See EPIC admission navigator for prior to admission medications     Location of Interview: Phone  Medication history sources: Patient's family/friend (Castillo-- [phone])    Significant changes made to the medication list:  Added:   Duloxetine   Claritin     Deleted:   Effexor       Additional medication history information:   No medications were available via sure scripts. Background was obtained by Care everywhere. I was told by the patient to contact her , Castillo. I called him, and he was able to confirm the patient's home medications. All changes made are listed above.     Medication reconciliation completed by provider prior to medication history? No    Time spent in this activity: 15 minutes     Prior to Admission medications    Medication Sig Last Dose Taking? Auth Provider   buPROPion (WELLBUTRIN XL) 150 MG 24 hr tablet Take 150 mg by mouth every morning 12/31/2021 at Unknown time Yes Unknown, Entered By History   DULoxetine (CYMBALTA) 20 MG capsule Take 20 mg by mouth daily 12/31/2021 at Unknown time Yes Unknown, Entered By History   levonorgestrel-ethinyl estradiol (AVIANE,ALESSE,LESSINA) 0.1-20 MG-MCG per tablet Take 1 tablet by mouth daily 12/31/2021 at Unknown time Yes Weiler, Karen, MD   loratadine (CLARITIN) 10 MG tablet Take 10 mg by mouth daily 12/31/2021 at Unknown time Yes Reported, Patient   multivitamin, therapeutic (THERA-VIT) TABS tablet Take 1 tablet by mouth daily 12/31/2021 at Unknown time Yes Unknown, Entered By History          MOMETASONE FUROATE 0.1 % EX CREA apply twice daily to body as needed.  Patient not taking: Reported on 1/1/2022 Not Taking at Unknown time  Ernie Pinon PA-C   PIMECROLIMUS 1 % EX CREA apply twice daily to face.  Patient not taking: Reported on 1/1/2022 Not Taking at Unknown time  Ernie Pinon PA-C       The  information provided in this note is only as accurate as the sources available at the time of update(s)      [Takes medication as prescribed] : takes [FreeTextEntry1] : gets psychiatric medications (Ambien, fluoxetine and diazepam via her psychiatrist)

## 2022-01-01 NOTE — DISCHARGE SUMMARY
[facility]  Discharge Summary        Diane Robison MRN# [id number]   YOB: 1985 Age: 36 year old     Date of Admission:  12/31/2021  Date of Discharge:  1/1/2022  Admitting Physician:  No admitting provider for patient encounter.  Discharge Physician: Diane Georges MD  Discharging Service: Hospitalist     Primary Provider: No Ref-Primary, Physician  Primary Care Physician Phone Number: None         Discharge Diagnoses/Problem Oriented Hospital Course (Providers):    Diane Robison was admitted on 12/31/2021 by No admitting provider for patient encounter. and I would refer you to their history and physical.  The following problems were addressed during her hospitalization:        Assessment & Plan     Diane Robison is a 36 year old female admitted on 12/31/2021. She has a PMH most remarkable for anxiety/depression who presented with intermittent epigastric pain. She was admitted with acute cholecystitis.     1. Acute Cholecystitis S/p Lap carrillo on 1/1/21 . Patient has right upper quadrant pain / epigastric with evidence of mild gallbladder dilation with a WBC of 19. She has mildly elevated AST (55) but no pattern of other biliary obstruction. No other obvious source of WBC elevation.   --General surgery consulted  -GIven one dose of cefepime   Doing well post op. Wants to discharge to home today      Chronic Issues: Awaiting med rec  1. Depression with anxiety  2. Hypothyroidism  3. Adjustment disorder        Diet:   full liquid diet advance to low fiber diet today   DVT Prophylaxis: Pneumatic Compression Devices  Perkins Catheter: Not present  Central Lines: None  Code Status:   Full Code    Dispo; home today in stable condition per pt     Diane Georges MD   Page [phone](7AM-6PM)    \   Full Code        Brief Hospital Stay Summary Sent Home With Patient in AVS:        Reason for your hospital stay      Gallbladder surgery                 Important Results:      See below          Pending Results:        Unresulted Labs Ordered in the Past 30 Days of this Admission     Date and Time Order Name Status Description    1/1/2022  1:54 AM Blood Culture Arm, Right In process     1/1/2022  1:54 AM Blood Culture Peripheral Blood In process             Discharge Instructions and Follow-Up:      Follow-up Appointments     Follow-up and recommended labs and tests       Follow up with General surgery on phone call at 2 weeks.  If any concerns   prior to that, please call our office [phone]  F/u with PCP in 1week. Recheck CBC, CMP in 1week               Discharge Disposition:      Discharged to home        Discharge Medications:        Current Discharge Medication List      START taking these medications    Details   oxyCODONE (ROXICODONE) 5 MG tablet Take 1 tablet (5 mg) by mouth every 4 hours as needed for moderate to severe pain  Qty: 12 tablet, Refills: 0    Associated Diagnoses: Acute cholecystitis         CONTINUE these medications which have NOT CHANGED    Details   buPROPion (WELLBUTRIN XL) 150 MG 24 hr tablet Take 150 mg by mouth every morning      DULoxetine (CYMBALTA) 20 MG capsule Take 20 mg by mouth daily      levonorgestrel-ethinyl estradiol (AVIANE,ALESSE,LESSINA) 0.1-20 MG-MCG per tablet Take 1 tablet by mouth daily  Qty: 30 tablet, Refills: 0    Comments: Patient must be seen in clinic for future refills  Associated Diagnoses: Contraceptive management      loratadine (CLARITIN) 10 MG tablet Take 10 mg by mouth daily      multivitamin, therapeutic (THERA-VIT) TABS tablet Take 1 tablet by mouth daily      MOMETASONE FUROATE 0.1 % EX CREA apply twice daily to body as needed.  Qty: 1 Tube, Refills: 3    Associated Diagnoses: Contact dermatitis and other eczema, due to unspecified cause      PIMECROLIMUS 1 % EX CREA apply twice daily to face.  Qty: 60 g, Refills: 3    Associated Diagnoses: Contact dermatitis and other eczema, due to unspecified cause         STOP taking these medications  "      buPROPion (WELLBUTRIN) 100 MG tablet Comments:   Reason for Stopping:                 Allergies:       No Known Allergies        Consultations This Hospital Stay:      Consultation during this admission received from surgery        Condition and Physical on Discharge:      Discharge condition: Stable   Vitals: Blood pressure 130/69, pulse 90, temperature 97.7  F (36.5  C), resp. rate 13, height 1.626 m (5' 4\"), weight 106.8 kg (235 lb 6.4 oz), last menstrual period 12/20/2021, SpO2 100 %, not currently breastfeeding.     Constitutional: Alert, awake, NAD    Lungs: CTA b/l    Cardiovascular: rrr   Abdomen: Soft, no distension, tender at the incision site , BS+   Skin: Warm and dry    Other:          Discharge Time:      greater   than 30 minutes.        Image Results From This Hospital Stay (For Non-EPIC Providers):        Results for orders placed or performed during the hospital encounter of 12/31/21   US Abdomen Limited (RUQ)    Narrative    EXAM: US ABDOMEN LIMITED  LOCATION: [facility]  DATE/TIME: 12/31/2021 11:30 PM    INDICATION: epigastric pain  COMPARISON: None.  TECHNIQUE: Limited abdominal ultrasound.    FINDINGS:    GALLBLADDER: Multiple large shadowing gallstones. Gallbladder wall is mildly thickened at 4 mm. Sonographic Patino sign reported to be negative.    BILE DUCTS: Mild central biliary ductal prominence. The common duct measures 6 mm.    LIVER: Normal parenchyma with smooth contour. No focal mass.    RIGHT KIDNEY: No hydronephrosis.    PANCREAS: The visualized portions are normal.    No ascites.      Impression    IMPRESSION:  1.  Multiple large gallstones with mild gallbladder wall thickening, but negative sonographic Patino's sign.    2.  Suggestion of mild central intrahepatic biliary ductal dilatation, though the common duct is at upper limits normal.                 Most Recent Lab Results In EPIC (For Non-EPIC Providers):    Most Recent 3 CBC's:  Recent Labs "   Lab Test 01/01/22  1227 12/31/21  2313   WBC 18.0* 19.1*   HGB 13.0 12.9   MCV 91 89    406      Most Recent 3 BMP's:  Recent Labs   Lab Test 01/01/22  1227 12/31/21  2313    138   POTASSIUM 3.9 3.6   CHLORIDE 110* 105   CO2 23 28   BUN 11 15   CR 0.87 0.95   ANIONGAP 4 5   BRICE 8.6 9.2   GLC 96 84     Most Recent 3 Troponin's:No lab results found.    Invalid input(s): TROP, TROPONINIES  Most Recent 3 INR's:No lab results found.  Most Recent 2 LFT's:  Recent Labs   Lab Test 01/01/22  1227 12/31/21  2313   * 55*   * 49   ALKPHOS 114 111   BILITOTAL 0.3 0.4     Most Recent Cholesterol Panel:No lab results found.  Most Recent 6 Bacteria Isolates From Any Culture (See EPIC Reports for Culture Details):No lab results found.  Most Recent TSH, T4 and HgbA1c: No lab results found.

## 2022-01-01 NOTE — PLAN OF CARE
POD0 for a lap carrillo  Pt is a/ox4. VS stable. Mild to moderate pain from abdominal incision, tylenol given once. Ice also applied to the site for comfort. Up independently in the room. Pt tolerated full liquid diet. One dose of abx given prior to discharge. IV removed. Discharge instructions and narcotics education given, pt verbalizes understanding.  arrived as transport.

## 2022-01-01 NOTE — ED NOTES
[facility]  ED Nurse Handoff Report    ED Chief complaint: Chest Pain      ED Diagnosis:   Final diagnoses:   Acute cholecystitis       Code Status: To assess upon IP arrival    Allergies: No Known Allergies    Patient Story: Patient to ED c/o 10/10 mid-sternal chest pain that radiates to the back.   Focused Assessment:    Labs Ordered and Resulted from Time of ED Arrival to Time of ED Departure   CBC WITH PLATELETS AND DIFFERENTIAL - Abnormal       Result Value    WBC Count 19.1 (*)     RBC Count 4.32      Hemoglobin 12.9      Hematocrit 38.5      MCV 89      MCH 29.9      MCHC 33.5      RDW 13.1      Platelet Count 406      % Neutrophils 78      % Lymphocytes 15      % Monocytes 6      % Eosinophils 0      % Basophils 0      % Immature Granulocytes 1      NRBCs per 100 WBC 0      Absolute Neutrophils 14.8 (*)     Absolute Lymphocytes 2.9      Absolute Monocytes 1.1      Absolute Eosinophils 0.1      Absolute Basophils 0.1      Absolute Immature Granulocytes 0.1      Absolute NRBCs 0.0     HEPATIC FUNCTION PANEL - Abnormal    Bilirubin Total 0.4      Bilirubin Direct 0.2      Protein Total 7.7      Albumin 3.8      Alkaline Phosphatase 111      AST 55 (*)     ALT 49     BASIC METABOLIC PANEL - Normal    Sodium 138      Potassium 3.6      Chloride 105      Carbon Dioxide (CO2) 28      Anion Gap 5      Urea Nitrogen 15      Creatinine 0.95      Calcium 9.2      Glucose 84      GFR Estimate 80     LIPASE - Normal    Lipase 148     HCG QUALITATIVE PREGNANCY - Normal    hCG Serum Qualitative Negative     COVID-19 VIRUS (CORONAVIRUS) BY PCR     US Abdomen Limited (RUQ)   Final Result   IMPRESSION:   1.  Multiple large gallstones with mild gallbladder wall thickening, but negative sonographic Patino's sign.      2.  Suggestion of mild central intrahepatic biliary ductal dilatation, though the common duct is at upper limits normal.                  Treatments and/or interventions provided: IV, labs, and  "imaging  Patient's response to treatments and/or interventions: tolerated    To be done/followed up on inpatient unit:  Monitor patient    Does this patient have any cognitive concerns?: N/A    Activity level - Baseline/Home:  Independent  Activity Level - Current:   Stand with Assist    Patient's Preferred language: English   Needed?: No    Isolation: None  Infection: Not Applicable  Patient tested for COVID 19 prior to admission: YES  Bariatric?: No    Vital Signs:   Vitals:    12/31/21 2234 12/31/21 2240 12/31/21 2308 12/31/21 2324   BP: (!) 148/75 (!) 145/67     BP Location: Right arm Right arm     Pulse: 107 77 89 80   Resp: 20 20 17 18   Temp: 98.2  F (36.8  C) 97.5  F (36.4  C)     TempSrc: Oral      SpO2: 98% 97% 98% 100%   Weight: 104.3 kg (230 lb) 83.9 kg (185 lb)     Height: 1.626 m (5' 4\")          Cardiac Rhythm:     Was the PSS-3 completed:   Yes  What interventions are required if any?               Family Comments:  at bedside  OBS brochure/video discussed/provided to patient/family: N/A                  For the majority of the shift this patient's behavior was Green.   Behavioral interventions performed were N/A.    ED NURSE PHONE NUMBER: *[id number]         "

## 2022-01-01 NOTE — ED NOTES
Pt lying calmly in supine position w/o complaint. Second set blood cultures collected and to lab. Pt to 6082 by cart with all belongings

## 2022-01-01 NOTE — ANESTHESIA PREPROCEDURE EVALUATION
Anesthesia Pre-Procedure Evaluation    Patient: Diane Robison   MRN: [id number] : 1985        Preoperative Diagnosis: Acute cholecystitis [K81.0]    Procedure : Procedure(s):  LAPAROSCOPIC CHOLECYSTECTOMY          Past Medical History:   Diagnosis Date     Allergic rhinitis, cause unspecified      Contact dermatitis and other eczema, due to unspecified cause      Primary focal hyperhidrosis     bilateral axillary       Past Surgical History:   Procedure Laterality Date     TONSILLECTOMY  2007      No Known Allergies   Social History     Tobacco Use     Smoking status: Never Smoker     Smokeless tobacco: Never Used   Substance Use Topics     Alcohol use: Yes     Comment: rarely      Wt Readings from Last 1 Encounters:   22 106.8 kg (235 lb 6.4 oz)        Prior to Admission medications    Medication Sig Start Date End Date Taking? Authorizing Provider   buPROPion (WELLBUTRIN) 100 MG tablet Take 1 tablet by mouth daily. 13   Suma Rodriguez APRN CNP   levonorgestrel-ethinyl estradiol (AVIANE,ALESSE,LESSINA) 0.1-20 MG-MCG per tablet Take 1 tablet by mouth daily 14   Weiler, Karen, MD   levothyroxine (SYNTHROID, LEVOTHROID) 50 MCG tablet Take 1 tablet by mouth daily. 13   Weiler, Karen, MD   MOMETASONE FUROATE 0.1 % EX CREA apply twice daily to body as needed. 5/24/10   Ernie Pinon PA-C   PIMECROLIMUS 1 % EX CREA apply twice daily to face. 5/24/10   Ernie Pinon PA-C   venlafaxine (EFFEXOR-ER) 150 MG TB24 Take 1 tablet by mouth daily. 13   Weiler, Karen, MD       Anesthesia Evaluation   Pt has had prior anesthetic. Type: General.    No history of anesthetic complications       ROS/MED HX  ENT/Pulmonary:     (+) allergic rhinitis,  (-) tobacco use, asthma and sleep apnea   Neurologic:    (-) no seizures, no CVA and migraines   Cardiovascular:    (-) hypertension, CAD, MASTERSON, arrhythmias, valvular problems/murmurs and dyslipidemia   METS/Exercise Tolerance: >4 METS     Hematologic:    (-) history of blood clots and anemia   Musculoskeletal:    (-) arthritis   GI/Hepatic:     (+) cholecystitis/cholelithiasis,  (-) GERD and liver disease   Renal/Genitourinary:    (-) renal disease and nephrolithiasis   Endo:     (+) thyroid problem, Obesity (morbid obesity),  (-) Type I DM and Type II DM   Psychiatric/Substance Use:     (+) psychiatric history anxiety and depression     Infectious Disease:    (-) Recent Fever   Malignancy:       Other:            Physical Exam    Airway        Mallampati: II   TM distance: > 3 FB   Neck ROM: full   Mouth opening: > 3 cm    Respiratory Devices and Support         Dental  no notable dental history         Cardiovascular   cardiovascular exam normal       Rhythm and rate: normal     Pulmonary   pulmonary exam normal        breath sounds clear to auscultation           OUTSIDE LABS:  CBC:   Lab Results   Component Value Date    WBC 19.1 (H) 12/31/2021    WBC 7.5 08/17/2010    HGB 12.9 12/31/2021    HGB 13.0 08/17/2010    HCT 38.5 12/31/2021    HCT 38.4 08/17/2010     12/31/2021     08/17/2010     BMP:   Lab Results   Component Value Date     12/31/2021     08/17/2010    POTASSIUM 3.6 12/31/2021    POTASSIUM 4.0 08/17/2010    CHLORIDE 105 12/31/2021    CHLORIDE 103 08/17/2010    CO2 28 12/31/2021    CO2 26 08/17/2010    BUN 15 12/31/2021    BUN 9 08/17/2010    CR 0.95 12/31/2021    CR 0.85 08/17/2010    GLC 84 12/31/2021    GLC 87 05/18/2013     COAGS: No results found for: PTT, INR, FIBR  POC:   Lab Results   Component Value Date    HCG  03/03/2010     Negative   This test provides a presumptive diagnosis of pregnancy or non-pregnancy. A   confirmed pregnancy diagnosis should only be made by a physician after all   clinical and laboratory findings have been evaluated.    HCGS Negative 12/31/2021     HEPATIC:   Lab Results   Component Value Date    ALBUMIN 3.8 12/31/2021    PROTTOTAL 7.7 12/31/2021    ALT 49 12/31/2021    AST  55 (H) 12/31/2021    ALKPHOS 111 12/31/2021    BILITOTAL 0.4 12/31/2021     OTHER:   Lab Results   Component Value Date    BRICE 9.2 12/31/2021    MAG 2.1 10/17/2002    LIPASE 148 12/31/2021    TSH 2.60 07/26/2013    T4 0.78 05/18/2013       Anesthesia Plan    ASA Status:  3   NPO Status:  NPO Appropriate    Anesthesia Type: General.     - Airway: ETT   Induction: Propofol.   Maintenance: Balanced.        Consents    Anesthesia Plan(s) and associated risks, benefits, and realistic alternatives discussed. Questions answered and patient/representative(s) expressed understanding.    - Discussed:     - Discussed with:  Patient         Postoperative Care    Pain management: Multi-modal analgesia.   PONV prophylaxis: Ondansetron (or other 5HT-3), Dexamethasone or Solumedrol, Scopolamine patch, Background Propofol Infusion     Comments:    Other Comments: Pre-op hydroxyzine  Intra-op toradol, dilaudid            Katia Dugan MD

## 2022-01-01 NOTE — PLAN OF CARE
Pt arrived to floor @ 0300 from ED. A&Ox4, VSS on RA. Denied pain during shift. Up SBA to BR - continent. R PIV SL with int abx. Diet NPO for possible surgery of gallstone removal. Discharge pending gen surg and colorectal consults.

## 2022-01-01 NOTE — ED PROVIDER NOTES
"  History   Chief Complaint:  Chest Pain     HPI   Diane Robison is a 36 year old female with history of anxiety and depression who presents with epigastric chest pain that has been intermittent for the past few hours. States she was sitting and talking with someone when it started. She laid down and the pain improved. When she sat up again it recurred. She had no shortness of breath or nausea with the episode. On my examination she rates the pain is 1/10 severity. She had a similar episode of chest pain within the last year that only lasted 30 minutes and resolved. She did not take any medications tonight for the pain. She denies fever and cough. No smoking or drug use. She drank approximately half a drink tonight before the pain started.     Review of Systems   Constitutional: Negative for fever.   Respiratory: Negative for cough and shortness of breath.    Cardiovascular: Positive for chest pain.   Gastrointestinal: Negative for nausea.   All other systems reviewed and are negative.    Allergies:  The patient has no known allergies.     Medications:  Wellbutrin  Lessina  Synthroid  Effexor-ER    Past Medical History:     Contact dermatitis  Focal hyperhidrosis  Adjustment disorder  Anxiety  Hypothyroidism  Depression    HPV    Past Surgical History:    Tonsillectomy    Family History:    Mother: hypertension  Father: cirrhosis  Sister: depression    Social History:  The patient presents to the ED with her .   The patient is COVID vaccinated (x3).   The patient denies smoking and drug use. Endorses alcohol use.     Physical Exam     Patient Vitals for the past 24 hrs:   BP Temp Temp src Pulse Resp SpO2 Height Weight   01/01/22 0339 (!) 141/77 98  F (36.7  C) Oral 88 18 99 % 1.626 m (5' 4\") 106.8 kg (235 lb 6.4 oz)   01/01/22 0225 -- -- -- -- -- 100 % -- --   01/01/22 0214 -- -- -- -- -- 100 % -- --   01/01/22 0202 -- -- -- -- -- 100 % -- --   01/01/22 0200 108/64 -- -- 82 -- 99 % -- --   01/01/22 0143 -- " "-- -- -- -- 99 % -- --   01/01/22 0130 114/76 -- -- 86 -- 100 % -- --   01/01/22 0113 -- -- -- -- -- 100 % -- --   01/01/22 0101 -- -- -- -- -- 100 % -- --   01/01/22 0100 119/72 -- -- 85 -- -- -- --   12/31/21 2324 -- -- -- 80 18 100 % -- --   12/31/21 2308 -- -- -- 89 17 98 % -- --   12/31/21 2240 (!) 145/67 97.5  F (36.4  C) -- 77 20 97 % -- 83.9 kg (185 lb)   12/31/21 2234 (!) 148/75 98.2  F (36.8  C) Oral 107 20 98 % 1.626 m (5' 4\") 104.3 kg (230 lb)   12/31/21 2233 -- -- -- -- -- -- -- 108 kg (238 lb)       Physical Exam  General: Appears well-developed and well-nourished.   Head: No signs of trauma.   CV: Normal rate and regular rhythm.    Resp: Effort normal and breath sounds normal. No respiratory distress.   GI: Soft. There is mild epigastric tenderness.  No rebound or guarding.  Normal bowel sounds.  No CVA tenderness.  MSK: Normal range of motion. no edema. No Calf tenderness.  Neuro: The patient is alert and oriented. Speech normal.  Skin: Skin is warm and dry. No rash noted.   Psych: normal mood and affect. behavior is normal.       Emergency Department Course   ECG  ECG taken at 2231, ECG read at 2311  Sinus tachycardia. Otherwise normal ECG.    Rate 122 bpm. MT interval 136 ms. QRS duration 78 ms. QT/QTc 316/450 ms. P-R-T axes 67 13 40.     Imaging:  US Abdomen Limited (RUQ)   Final Result   IMPRESSION:   1.  Multiple large gallstones with mild gallbladder wall thickening, but negative sonographic Patino's sign.      2.  Suggestion of mild central intrahepatic biliary ductal dilatation, though the common duct is at upper limits normal.              Report per radiology    Laboratory:  Labs Ordered and Resulted from Time of ED Arrival to Time of ED Departure   CBC WITH PLATELETS AND DIFFERENTIAL - Abnormal       Result Value    WBC Count 19.1 (*)     RBC Count 4.32      Hemoglobin 12.9      Hematocrit 38.5      MCV 89      MCH 29.9      MCHC 33.5      RDW 13.1      Platelet Count 406      % " Neutrophils 78      % Lymphocytes 15      % Monocytes 6      % Eosinophils 0      % Basophils 0      % Immature Granulocytes 1      NRBCs per 100 WBC 0      Absolute Neutrophils 14.8 (*)     Absolute Lymphocytes 2.9      Absolute Monocytes 1.1      Absolute Eosinophils 0.1      Absolute Basophils 0.1      Absolute Immature Granulocytes 0.1      Absolute NRBCs 0.0     HEPATIC FUNCTION PANEL - Abnormal    Bilirubin Total 0.4      Bilirubin Direct 0.2      Protein Total 7.7      Albumin 3.8      Alkaline Phosphatase 111      AST 55 (*)     ALT 49     BASIC METABOLIC PANEL - Normal    Sodium 138      Potassium 3.6      Chloride 105      Carbon Dioxide (CO2) 28      Anion Gap 5      Urea Nitrogen 15      Creatinine 0.95      Calcium 9.2      Glucose 84      GFR Estimate 80     LIPASE - Normal    Lipase 148     HCG QUALITATIVE PREGNANCY - Normal    hCG Serum Qualitative Negative     COVID-19 VIRUS (CORONAVIRUS) BY PCR - Normal    SARS CoV2 PCR Negative     BLOOD CULTURE   BLOOD CULTURE        Emergency Department Course:  Reviewed:  I reviewed nursing notes, vitals, past medical history, Care Everywhere and [facility]    Assessments:  2312 I obtained history and examined the patient as noted above.   0025 I rechecked the patient and explained findings.     Consults:  0039 I spoke with Dr. Will, the Hospitalist, who accepted the patient.     Interventions:  2329 Pepcid 20mg IV    Disposition:  The patient was admitted to the hospital under the care of Dr. Will.     Impression & Plan     Medical Decision Making:  Diane Robison is a 36 year old female who presents due to epigastric pain.  She states that she had the pain intermittently over the last few hours.  Pain is worse when she is sitting up.  At the time my evaluation, she states that the pain had largely resolved but she still had tenderness in the epigastric region.  Blood work was obtained that did show an elevated white blood cell count.  I did obtain an ultrasound  which did show gallstones with some gallbladder wall thickening.  Given the patient's discomfort with elevated white blood cell count and ultrasound findings, I believe the patient has acute cholecystitis and she will be admitted to the hospital service for surgical consultation.    Diagnosis:    ICD-10-CM    1. Acute cholecystitis  K81.0        Scribe Disclosure:  I, Denia Bonilla, am serving as a scribe at 11:12 PM on 12/31/2021 to document services personally performed by Rich Porter MD based on my observations and the provider's statements to me.      Rich Porter MD  01/01/22 0459

## 2022-01-01 NOTE — DISCHARGE INSTRUCTIONS
[facility] - [facility]  Discharge Instructions: Post-Operative Laparoscopic Cholecystectomy    ACTIVITY    Expect to feel tired after your surgery.  This will gradually resolve.      Take frequent, short walks and increase your activity gradually.      Avoid strenuous physical activity or heavy lifting greater than 15-20 lbs. for 2-3 weeks.  You may climb stairs.    You may drive without restrictions when you are not using any prescription pain medication and feel comfortable in a car.    You may return to work/school when you are comfortable without any prescription pain medication.    WOUND CARE    You may remove your outer dressing or Band-Aids and shower 48 hours after the surgery.  Pat your incisions dry and leave them open to air.  Re-apply dressing (Band-Aids or gauze/tape) as needed for comfort or drainage.    You may have steri-strips (looks like white tape) on your incision.  You may peel off the steri-strips 2 weeks after your surgery if they have not peeled off on their own.     Do not soak your incisions in a tub or pool for 2 weeks.     Do not apply any lotions, creams, or ointments to your incisions.    A ridge under your incisions is normal and will gradually resolve.    DIET    Start with liquids, then gradually resume your regular diet as tolerated.  Avoid heavy, spicy, and greasy meals for 2-3 days.    Drink plenty of fluids to stay hydrated.    It is not uncommon to experience some loose stools or diarrhea after surgery.  This is your body's way of adapting to the bile which will slowly drain into your intestine.  A low fat diet may help with this.  This should improve over 1-2 months.    PAIN    Expect some tenderness and discomfort at the incision sites.  Use the prescribed pain medication at your discretion.  Expect gradual resolution of your pain over several days.    You may take ibuprofen with food (unless you have been told not to) or acetaminophen/Tylenol instead of or  in addition to your prescribed pain medication.  However, if you are taking Norco or Percocet, do not take any additional acetaminophen/Tylenol.    Do not drink alcohol or drive while you are taking pain medications.    You may apply ice to your incisions in 20 minute intervals as needed for the next 48 hours.  After that time, consider switching to heat if you prefer.    EXPECTATIONS    Pain medications can cause constipation.  Limit use when possible.  Take an over the counter or prescribed stool softener/stimulant, such as Colace or Senna, 1-2 times a day with plenty of water.  You may take a mild over the counter laxative, such as Miralax or a suppository, as needed.  You may discontinue these medications once you are having regular bowel movements and/or are no longer taking your narcotic pain medication.      You may have shoulder or upper back discomfort due to the gas used in surgery.  This is temporary and should resolve in 48-72 hours.  Short, frequent walks may help with this.    If you are unable to urinate for 8 hours or feel as though you are not emptying your bladder adequately, we recommend you seek care at an ER or Urgent Care facility for possible catheter placement.     FOLLOW UP    Our office will contact you in approximately 2-3 weeks to check on your progress and answer any questions you may have.  If you are doing well, you will not need to return for a follow up appointment.  If any concerns are identified over the phone, we will help you make an appointment to see a provider.     If you have not received a phone call, have any questions or concerns, or would like to be seen, please call us at [phone] and ask to speak with our nurse.  We are located at [address] [place] [address].    CALL OUR OFFICE [phone] IF YOU HAVE:     Chills or fever above 101 F.    Increased redness, warmth, or drainage at your incisions.    Significant bleeding.    Pain not  relieved by your pain medication or rest.    Increasing pain after the first 48 hours.    Any other concerns or questions.                      Revised December 2021

## 2022-01-01 NOTE — PROGRESS NOTES
RECEIVING UNIT ED HANDOFF REVIEW    ED Nurse Handoff Report was reviewed by: Kelin Tom RN on January 1, 2022 at 3:04 AM

## 2022-01-01 NOTE — ANESTHESIA PROCEDURE NOTES
Airway         Procedure Start/Stop Times: 1/1/2022 9:25 AM  Staff -        Anesthesiologist:  Katia Dugan MD       CRNA: Fadi Garcia APRN CRNA       Performed By: CRNA  Consent for Airway        Urgency: elective  Indications and Patient Condition       Indications for airway management: uzma-procedural       Induction type:intravenous       Mask difficulty assessment: 2 - vent by mask + OA or adjuvant +/- NMBA    Final Airway Details       Final airway type: endotracheal airway       Successful airway: ETT - single  Endotracheal Airway Details        ETT size (mm): 7.0       Cuffed: yes       Cuff volume (mL): 10       Successful intubation technique: direct laryngoscopy       DL Blade Type: MAC 3       Grade View of Cords: 1       Adjucts: stylet       Position: Right       Measured from: lips       Secured at (cm): 22       Bite block used: None    Post intubation assessment        Placement verified by: capnometry, equal breath sounds and chest rise        Number of attempts at approach: 1       Number of other approaches attempted: 0       Secured with: pink tape       Ease of procedure: easy       Dentition: Intact and Unchanged

## 2022-01-01 NOTE — CONSULTS
General Surgery Consultation    Diane Robison MRN#: [id number]   Age: 36 year old YOB: 1985     Date of Admission:  12/31/2021  Reason for consult: Cholecystitis       Requesting physician: Hal Will MD       Surgeon:        Juan C Caro MD        Chief Complaint:   Abdominal pain, epigastric     History is obtained from the patient and chart review.         History of Present Illness:   General Surgery was asked to evaluate this patient at the request of Dr. Will for abdominal pain and suspected acute cholecystitis with cholelithiasis.    This patient is a 36 year old  female who presents with epigastric abdominal pain.  This is post prandial and intermittent in nature.  It is centered over her epigastric area but also tender under Right rib border.  Pain is relieved by laying down.  She first reported that she hasn't had this pain before but she offers that she was dealing with some shoulder and other ailments and thinks she probably did have other episodes in past that were masked by these other issues.  Denies fevers, chills, CP, SOB, nausea, vomiting or change in bowel habits.  She reports with COVID, and especially over the last year, her diet has changed to become less healthy.  Does not know of any family members that have had gallbladder issues.          Past Medical History:   Diane Robison  has a past medical history of Allergic rhinitis, cause unspecified, Contact dermatitis and other eczema, due to unspecified cause, and Primary focal hyperhidrosis.          Past Surgical History:     Past Surgical History:   Procedure Laterality Date     TONSILLECTOMY  2007   Does not recall any issues with anesthesia or bleeding post op.          Social History:     Social History     Tobacco Use     Smoking status: Never Smoker     Smokeless tobacco: Never Used   Substance Use Topics     Alcohol use: Yes     Comment: rarely             Family History:   This patient has no  "significant family history          Allergies:   No Known Allergies          Medications:     Prior to Admission medications    Medication Sig Start Date End Date Taking? Authorizing Provider   buPROPion (WELLBUTRIN) 100 MG tablet Take 1 tablet by mouth daily. 4/26/13   Suma Rodriguez APRN CNP   levonorgestrel-ethinyl estradiol (AVIANE,ALESSE,LESSINA) 0.1-20 MG-MCG per tablet Take 1 tablet by mouth daily 6/17/14   Weiler, Karen, MD   levothyroxine (SYNTHROID, LEVOTHROID) 50 MCG tablet Take 1 tablet by mouth daily. 6/7/13   Weiler, Karen, MD   MOMETASONE FUROATE 0.1 % EX CREA apply twice daily to body as needed. 5/24/10   Ernie Pinon PA-C   PIMECROLIMUS 1 % EX CREA apply twice daily to face. 5/24/10   Ernie Pinon PA-C   venlafaxine (EFFEXOR-ER) 150 MG TB24 Take 1 tablet by mouth daily. 6/20/13   Weiler, Karen, MD             Review of Systems:   The Review of Systems is negative other than noted in the HPI          Physical Exam:   Blood pressure (!) 141/77, pulse 88, temperature 98  F (36.7  C), temperature source Oral, resp. rate 18, height 1.626 m (5' 4\"), weight 106.8 kg (235 lb 6.4 oz), last menstrual period 12/20/2021, SpO2 99 %, not currently breastfeeding.    General - This is a well developed, well nourished female in no apparent distress.  HEENT - Normocephalic. Atraumatic. Moist mucous membranes. Pupils equal.  No scleral icterus.  Neck - Supple without masses.  Trachea midline  Lungs - Clear to ascultation bilaterally.    Heart - Regular rate & rhythm without murmur.  Abdomen - Soft, nondistended with +bowel sounds.   Tender to RUQ and epigastric areas   Extremities - Moves all extremities. Warm without edema.  Neurologic - Nonfocal. Very pleasant affect.  Answers questions appropriately.          Data:   Labs:  WBC -   WBC   Date Value Ref Range Status   08/17/2010 7.5 4.0 - 11.0 10e9/L Final     WBC Count   Date Value Ref Range Status   12/31/2021 19.1 (H) 4.0 - 11.0 10e3/uL " Final     Hgb -   Hemoglobin   Date Value Ref Range Status   12/31/2021 12.9 11.7 - 15.7 g/dL Final   08/17/2010 13.0 11.7 - 15.7 g/dL Final       Liver Function Studies -   Recent Labs   Lab Test 12/31/21  2313   PROTTOTAL 7.7   ALBUMIN 3.8   BILITOTAL 0.4   ALKPHOS 111   AST 55*   ALT 49       CT scan of the abdomen: none      Ultrasound of the abdomen:                                                                   IMPRESSION:  1.  Multiple large gallstones with mild gallbladder wall thickening, but negative sonographic Patino's sign.     2.  Suggestion of mild central intrahepatic biliary ductal dilatation, though the common duct is at upper limits normal.     As read by Radiology      EKG:   Complete; See Chart         Assessment:     Acute cholecystitis with Cholelithiasis       Plan:     Discussed anatomy and physiology of gallbladder with patient and surgical options.  She would like to proceed with Lap Cholecystectomy today.  Continue NPO  Continue Rocephin until surgery.  Post op antibiotics likely not necessary.  Discussed recovery expectations  Hopeful for discharge to home from PACU if no complications and pt meets discharge criteria.        Marco Husain PA-C, physician assistant for Juan C Caro MD  [facility], [phone]  Pager [phone]

## 2022-01-04 LAB
PATH REPORT.COMMENTS IMP SPEC: NORMAL
PATH REPORT.COMMENTS IMP SPEC: NORMAL
PATH REPORT.FINAL DX SPEC: NORMAL
PATH REPORT.GROSS SPEC: NORMAL
PATH REPORT.MICROSCOPIC SPEC OTHER STN: NORMAL
PATH REPORT.RELEVANT HX SPEC: NORMAL
PHOTO IMAGE: NORMAL

## 2022-01-06 LAB
BACTERIA BLD CULT: NO GROWTH
BACTERIA BLD CULT: NO GROWTH

## 2022-01-18 ENCOUNTER — TELEPHONE (OUTPATIENT)
Dept: SURGERY | Facility: CLINIC | Age: 37
End: 2022-01-18
Payer: COMMERCIAL

## 2022-01-18 NOTE — TELEPHONE ENCOUNTER
[facility]  Post op call note     Diane Robison was called for an update regarding her recovery.  She underwent a Lap Sara by Dr. Caro on January / 01 / 2022.  Today she tells me she is doing well and denies any complaints.  She is eating a normal diet and her bowels are regular or a little on the constipation side.  She states her wounds are healing well and denies any erythema or drainage at her wounds.     The pathology revealed chronic cholecystitis with cholelithiasis.  This was discussed with the patient.     She was instructed to gradually resume all normal activities. The patient states all of her questions were answered and she agrees to follow up in clinic as needed.    Brigido Husain PA-C        Please route or send letter to:  Primary Care Provider (PCP)

## 2022-01-19 ENCOUNTER — TELEPHONE (OUTPATIENT)
Dept: SURGERY | Facility: CLINIC | Age: 37
End: 2022-01-19
Payer: COMMERCIAL

## 2022-01-19 NOTE — TELEPHONE ENCOUNTER
Patient had a lap carrillo 1/1/22 BRP.  One of the incisions looks different from the others, would like to discuss how its closing up    Phone: [phone]  Message ok

## 2022-01-19 NOTE — TELEPHONE ENCOUNTER
Surgery: LAPAROSCOPIC CHOLECYSTECTOMY  Date: 1/1/22  Surgeon: BRADLEY    Return pt call re: incision looks     The large incision is tightly sealed  The one on her side is split open, bright red inside, piece of white stuff in -- not sure what the white stuff is  No redness around it  No swelling   Raised - but thinks it is normal?  No bleeding or drainage  No fever    See mychart encounter for today. Picture attached.     Will send to [facility] to see what white thing is and if any further advice.    Savanna Wharton RN on 1/19/2022 at 9:48 AM

## 2022-01-19 NOTE — TELEPHONE ENCOUNTER
Viewed pictures.  No signs of infection.  The white showing is likely the vicryl stitch.  This is absorbable.  Will eventually flake off like a scab.  Continue to keep clean, dry, and covered until scab forms or looks more like her bigger incision which is healing great.  Can shower with all incisions open, but place new bandaid over the incision in question.

## 2022-01-19 NOTE — TELEPHONE ENCOUNTER
"Called pt to discuss with patient information from Rodrigue PARRY  \"Viewed pictures.  No signs of infection.  The white showing is likely the vicryl stitch.  This is absorbable.  Will eventually flake off like a scab.  Continue to keep clean, dry, and covered until scab forms or looks more like her bigger incision which is healing great.  Can shower with all incisions open, but place new bandaid over the incision in question.\"    Pt verbalized understanding and agreeable to call if anything changes or worsens.    No further questions per pt.    Savanna Wharton RN on 1/19/2022 at 11:25 AM    "

## 2022-02-24 ENCOUNTER — TELEPHONE (OUTPATIENT)
Dept: SURGERY | Facility: CLINIC | Age: 37
End: 2022-02-24
Payer: COMMERCIAL

## 2022-02-24 NOTE — TELEPHONE ENCOUNTER
Procedure: Laparoscopic cholecystectomy    Date: 01/01/2022    Surgeon: Gordo    Patient reporting new onset of nausea and dizziness, starting today. This has NOT been ongoing since surgery. She has been feeling fine up until this point    She reports her mom was recently experiencing similar symptoms and ended up needed to go to the ER where she found her hemoglobin to be low and she was bleeding internally. Patient worried this same could be happening to her d/t surgery.    She reports she is adequately hydrated.  Bowel movements have not been regular. No blood in stool noted.  Urinating without issue    No RUQ pain or any abdominal pain    Informed her that it is not likely related to surgery at this point, especially because these are new symptoms.  Recommend she contact her PCP to discuss.      She can also try gatorade or similar for electrolytes, to see if this helps symptoms    She is in agreement and she will call PRNADEEN Chou RN-BSN

## 2022-02-24 NOTE — TELEPHONE ENCOUNTER
Patient had a lap carrillo 1/1/22 BRP and is feeling nauseas and dizzy today, wondering if it could be related to surgery.    Phone: [phone]  Message ok

## 2022-03-05 ENCOUNTER — HEALTH MAINTENANCE LETTER (OUTPATIENT)
Age: 37
End: 2022-03-05

## 2022-08-29 ENCOUNTER — TRANSFERRED RECORDS (OUTPATIENT)
Dept: HEALTH INFORMATION MANAGEMENT | Facility: CLINIC | Age: 37
End: 2022-08-29

## 2022-11-09 ENCOUNTER — TRANSFERRED RECORDS (OUTPATIENT)
Dept: HEALTH INFORMATION MANAGEMENT | Facility: CLINIC | Age: 37
End: 2022-11-09

## 2022-11-20 ENCOUNTER — HEALTH MAINTENANCE LETTER (OUTPATIENT)
Age: 37
End: 2022-11-20

## 2022-11-30 ENCOUNTER — TRANSFERRED RECORDS (OUTPATIENT)
Dept: HEALTH INFORMATION MANAGEMENT | Facility: CLINIC | Age: 37
End: 2022-11-30

## 2023-04-15 ENCOUNTER — HEALTH MAINTENANCE LETTER (OUTPATIENT)
Age: 38
End: 2023-04-15

## 2024-06-22 ENCOUNTER — HEALTH MAINTENANCE LETTER (OUTPATIENT)
Age: 39
End: 2024-06-22

## (undated) DEVICE — EVAC SYSTEM CLEAR FLOW SC082500

## (undated) DEVICE — SU VICRYL 4-0 PS-2 18" UND J496H

## (undated) DEVICE — ESU HOLDER LAP INST DISP PURPLE LONG 330MM H-PRO-330

## (undated) DEVICE — PREP CHLORAPREP 26ML TINTED ORANGE  260815

## (undated) DEVICE — GLOVE PROTEXIS W/NEU-THERA 8.0  2D73TE80

## (undated) DEVICE — POUCH TISSUE RETRIEVAL LAP 10MM 2.21" INTRO TRS100SB2

## (undated) DEVICE — LINEN TOWEL PACK X5 5464

## (undated) DEVICE — SOL WATER IRRIG 1000ML BOTTLE 2F7114

## (undated) DEVICE — EPIX UNIVERSAL CLIP APPLIER

## (undated) DEVICE — SUCTION IRR STRYKERFLOW II W/TIP 250-070-520

## (undated) DEVICE — GOWN IMPERVIOUS SPECIALTY XLG/XLONG 32474

## (undated) DEVICE — SUCTION CANISTER MEDIVAC LINER 3000ML W/LID 65651-530

## (undated) DEVICE — SOL NACL 0.9% INJ 1000ML BAG 2B1324X

## (undated) DEVICE — ENDO TROCAR FIRST ENTRY KII FIOS Z-THRD 05X100MM CTF03

## (undated) DEVICE — ENDO SCOPE WARMER LF TM500

## (undated) DEVICE — ENDO TROCAR SLEEVE KII Z-THREADED 05X100MM CTS02

## (undated) DEVICE — ENDO TROCAR FIRST ENTRY KII FIOS Z-THRD 11X100MM CTF33

## (undated) DEVICE — ESU GROUND PAD UNIVERSAL W/O CORD

## (undated) DEVICE — PACK LAP CHOLE SLC15LCFSD

## (undated) RX ORDER — FENTANYL CITRATE 0.05 MG/ML
INJECTION, SOLUTION INTRAMUSCULAR; INTRAVENOUS
Status: DISPENSED
Start: 2022-01-01

## (undated) RX ORDER — LIDOCAINE HYDROCHLORIDE 20 MG/ML
INJECTION, SOLUTION EPIDURAL; INFILTRATION; INTRACAUDAL; PERINEURAL
Status: DISPENSED
Start: 2022-01-01

## (undated) RX ORDER — HYDROXYZINE HYDROCHLORIDE 25 MG/1
TABLET, FILM COATED ORAL
Status: DISPENSED
Start: 2022-01-01

## (undated) RX ORDER — SCOLOPAMINE TRANSDERMAL SYSTEM 1 MG/1
PATCH, EXTENDED RELEASE TRANSDERMAL
Status: DISPENSED
Start: 2022-01-01

## (undated) RX ORDER — FENTANYL CITRATE 50 UG/ML
INJECTION, SOLUTION INTRAMUSCULAR; INTRAVENOUS
Status: DISPENSED
Start: 2022-01-01

## (undated) RX ORDER — PROPOFOL 10 MG/ML
INJECTION, EMULSION INTRAVENOUS
Status: DISPENSED
Start: 2022-01-01

## (undated) RX ORDER — DEXAMETHASONE SODIUM PHOSPHATE 4 MG/ML
INJECTION, SOLUTION INTRA-ARTICULAR; INTRALESIONAL; INTRAMUSCULAR; INTRAVENOUS; SOFT TISSUE
Status: DISPENSED
Start: 2022-01-01

## (undated) RX ORDER — KETOROLAC TROMETHAMINE 30 MG/ML
INJECTION, SOLUTION INTRAMUSCULAR; INTRAVENOUS
Status: DISPENSED
Start: 2022-01-01

## (undated) RX ORDER — ONDANSETRON 2 MG/ML
INJECTION INTRAMUSCULAR; INTRAVENOUS
Status: DISPENSED
Start: 2022-01-01

## (undated) RX ORDER — HYDROMORPHONE HYDROCHLORIDE 1 MG/ML
INJECTION, SOLUTION INTRAMUSCULAR; INTRAVENOUS; SUBCUTANEOUS
Status: DISPENSED
Start: 2022-01-01